# Patient Record
Sex: MALE | Race: WHITE | NOT HISPANIC OR LATINO | Employment: OTHER | URBAN - NONMETROPOLITAN AREA
[De-identification: names, ages, dates, MRNs, and addresses within clinical notes are randomized per-mention and may not be internally consistent; named-entity substitution may affect disease eponyms.]

---

## 2018-09-27 ENCOUNTER — TRANSCRIBE ORDERS (OUTPATIENT)
Dept: ORTHOPEDIC SURGERY | Facility: CLINIC | Age: 58
End: 2018-09-27

## 2018-09-27 DIAGNOSIS — M54.9 BACK PAIN WITH SCIATICA: Primary | ICD-10-CM

## 2018-09-27 DIAGNOSIS — M54.30 BACK PAIN WITH SCIATICA: Primary | ICD-10-CM

## 2018-10-04 DIAGNOSIS — M54.5 LOW BACK PAIN, UNSPECIFIED BACK PAIN LATERALITY, UNSPECIFIED CHRONICITY, WITH SCIATICA PRESENCE UNSPECIFIED: Primary | ICD-10-CM

## 2018-10-08 ENCOUNTER — OFFICE VISIT (OUTPATIENT)
Dept: ORTHOPEDIC SURGERY | Facility: CLINIC | Age: 58
End: 2018-10-08

## 2018-10-08 VITALS — WEIGHT: 181 LBS | BODY MASS INDEX: 27.43 KG/M2 | HEIGHT: 68 IN

## 2018-10-08 DIAGNOSIS — M51.36 DDD (DEGENERATIVE DISC DISEASE), LUMBAR: ICD-10-CM

## 2018-10-08 DIAGNOSIS — M54.5 LOW BACK PAIN, UNSPECIFIED BACK PAIN LATERALITY, UNSPECIFIED CHRONICITY, WITH SCIATICA PRESENCE UNSPECIFIED: Primary | ICD-10-CM

## 2018-10-08 PROBLEM — M54.50 LOW BACK PAIN: Status: ACTIVE | Noted: 2018-10-08

## 2018-10-08 PROCEDURE — 99205 OFFICE O/P NEW HI 60 MIN: CPT | Performed by: ORTHOPAEDIC SURGERY

## 2018-10-08 RX ORDER — LISINOPRIL 20 MG/1
20 TABLET ORAL DAILY
COMMUNITY

## 2018-10-08 NOTE — PROGRESS NOTES
Farrukh Helton is a 58 y.o. male   Primary provider:  Elizabeth Espinoza MD       Chief Complaint   Patient presents with   • Lumbar Spine - Pain   x-rays done lumbar spine in office today   [facility] 06/08/18  HISTORY OF PRESENT ILLNESS: low back and leg pain for about 30 years.  Pain scale today 7/10  58 y back pain, present in the low back, the pain is present continuously.  The pain is worse with sitting and standing.  The pain is improved laying on the side.    The pain is present in the low back.  The pain is severe and unrelenting.  + chills  No fevers.  No nighttime awakening pain  Pain is related to his service history in the [employer] for 20 years, dating back to 1986-87 2012 lumbar surgery performed in [place], [facility]   This is a chronic problem. The current episode started more than 1 year ago. The problem occurs constantly. The problem has been gradually worsening. Associated symptoms include chills and a fever. Associated symptoms comments: Low back and legs pain . The symptoms are aggravated by walking and standing (sitting and drving ). He has tried rest (injections) for the symptoms. The treatment provided mild relief.            CONCURRENT MEDICAL HISTORY:    Past Medical History:   Diagnosis Date   • Diabetes (CMS/HCC)        No Known Allergies      Current Outpatient Prescriptions:   •  lisinopril (PRINIVIL,ZESTRIL) 40 MG tablet, Take 40 mg by mouth Daily., Disp: , Rfl:     Past Surgical History:   Procedure Laterality Date   • CARDIAC VALVE REPLACEMENT         No family history on file.     Social History     Social History   • Marital status:      Spouse name: N/A   • Number of children: N/A   • Years of education: N/A     Occupational History   • Not on file.     Social History Main Topics   • Smoking status: Never Smoker   • Smokeless tobacco: Never Used   • Alcohol use No   • Drug use: No   • Sexual activity: Not on file     Other Topics Concern   • Not on  "file     Social History Narrative   • No narrative on file        Review of Systems   Constitutional: Positive for chills and fever.   HENT: Negative.    Eyes: Negative.    Respiratory: Negative.    Cardiovascular: Negative.         Irregular heartbeat     Gastrointestinal: Negative.    Endocrine: Negative.    Genitourinary: Negative.    Musculoskeletal: Positive for back pain.        Stiffness   Skin: Negative.    Allergic/Immunologic: Negative.    Neurological: Negative.    Hematological: Negative.    Psychiatric/Behavioral: Positive for sleep disturbance.       PHYSICAL EXAMINATION:       Ht 172.7 cm (68\")   Wt 82.1 kg (181 lb)   BMI 27.52 kg/m²     Physical Exam   Constitutional: He appears well-developed.   HENT:   Head: Normocephalic and atraumatic.   Eyes: Pupils are equal, round, and reactive to light. Right eye exhibits no discharge. Left eye exhibits no discharge.   Neck: Normal range of motion. No JVD present. No tracheal deviation present. No thyromegaly present.   Cardiovascular: Normal rate and intact distal pulses.  Exam reveals no gallop.    Pulmonary/Chest: Effort normal and breath sounds normal. No respiratory distress. He has no wheezes. He has no rales. He exhibits no tenderness.   Abdominal: Soft. Bowel sounds are normal. He exhibits no distension. There is no tenderness. There is no guarding.   Musculoskeletal: He exhibits no edema, tenderness or deformity.   Lymphadenopathy:     He has no cervical adenopathy.   Neurological: He is alert. He has normal reflexes. He displays normal reflexes. No cranial nerve deficit. Coordination normal.   Skin: Skin is warm. No rash noted. No erythema.   Psychiatric: He has a normal mood and affect. His behavior is normal. Thought content normal.       GAIT:     []  Normal  []  Antalgic    Assistive device: [x]  None  []  Walker     []  Crutches  []  Cane     []  Wheelchair  []  Stretcher    Back Exam     Tenderness   The patient is experiencing tenderness in " the lumbar.    Range of Motion   Extension: 10   Flexion: 70   Lateral Bend Right: 10   Lateral Bend Left: 10   Rotation Right: 10   Rotation Left: 10     Tests   Straight leg raise right: negative  Straight leg raise left: negative              MRI [facility]   IMPRESSION    1. No spinal stenosis or left nerve root compression.  2.  At the L3-4 level there is right foraminal narrowing with possible but doudtful impact on the right L3-4 nerve root.      I reviewed the MRI of lumbar spine there is lumbar degenerative disc disease of L2-3 and L3-4, with disc bulging into the canal at L2-3 and L3-4.    ASSESSMENT:    Diagnoses and all orders for this visit:    Low back pain, unspecified back pain laterality, unspecified chronicity, with sciatica presence unspecified    DDD (degenerative disc disease), lumbar  -     Case Request; Standing  -     ceFAZolin (ANCEF) 2 g in sodium chloride 0.9 % 100 mL IVPB; Infuse 2 g into a venous catheter 1 (One) Time.  -     Case Request    Other orders  -     lisinopril (PRINIVIL,ZESTRIL) 40 MG tablet; Take 40 mg by mouth Daily.  -     Obtain informed consent; Future  -     Obtain Informed Consent; Standing  -     aPTT; Standing  -     Basic Metabolic Panel; Standing  -     CBC (No Diff); Standing  -     ECG 12 Lead; Standing  -     XR Chest PA & Lateral; Standing  -     Protime-INR; Standing          PLAN    I recommend lumbar interbody fusion of L2-3 and L3-4, lateral; I discussed with him the operative approach, the risks and all aspects of surgical treatment.    I reviewed the technique and expected post operative recovery course.  Risks of surgery discussed including the risk of infection, wound healing complications, hematoma, spinal fluid leak, neurologic weakness, numbness, paralysis partial or complete, malpositioning of instrumentation, loosening, pseudoarthrosis, adjacent segment degeneration, worsening of pain, additional surgery, revision surgery.  Medical risks  of surgery include risk of death, blindness, heart attack, stroke, uncontrolled blood loss requiring blood transfusion, gastric ulceration perforation peritonitis, deep vein thrombosis pulmonary embolus, pneumonia, pulmonary failure necessitating prolonged ventilation, heart failure, renal failure, sepsis and multiorgan system failure.  I explained to him/her the decision to proceed for surgery is a quality of life decision, and shall be undertaken when all other treatment options have been exhausted.  He/she understands and agrees to proceed.     Needs clearance from cardiologist prior to surgery.      Andi Lopez MD

## 2021-09-15 ENCOUNTER — HOSPITAL ENCOUNTER (EMERGENCY)
Facility: HOSPITAL | Age: 61
Discharge: HOME | End: 2021-09-15
Attending: FAMILY MEDICINE | Admitting: FAMILY MEDICINE

## 2021-09-15 ENCOUNTER — APPOINTMENT (OUTPATIENT)
Dept: ULTRASOUND IMAGING | Facility: HOSPITAL | Age: 61
End: 2021-09-15

## 2021-09-15 VITALS
RESPIRATION RATE: 16 BRPM | WEIGHT: 182 LBS | BODY MASS INDEX: 27.58 KG/M2 | DIASTOLIC BLOOD PRESSURE: 73 MMHG | HEIGHT: 68 IN | TEMPERATURE: 98 F | SYSTOLIC BLOOD PRESSURE: 150 MMHG | OXYGEN SATURATION: 98 % | HEART RATE: 58 BPM

## 2021-09-15 DIAGNOSIS — M54.42 CHRONIC LEFT-SIDED LOW BACK PAIN WITH LEFT-SIDED SCIATICA: Primary | ICD-10-CM

## 2021-09-15 DIAGNOSIS — G89.29 CHRONIC LEFT-SIDED LOW BACK PAIN WITH LEFT-SIDED SCIATICA: Primary | ICD-10-CM

## 2021-09-15 LAB
ABO GROUP BLD: NORMAL
ALBUMIN SERPL-MCNC: 4.3 G/DL (ref 3.5–5.2)
ALBUMIN/GLOB SERPL: 1.7 G/DL
ALP SERPL-CCNC: 83 U/L (ref 39–117)
ALT SERPL W P-5'-P-CCNC: 25 U/L (ref 1–41)
ANION GAP SERPL CALCULATED.3IONS-SCNC: 8 MMOL/L (ref 5–15)
AST SERPL-CCNC: 19 U/L (ref 1–40)
BASOPHILS # BLD AUTO: 0.07 10*3/MM3 (ref 0–0.2)
BASOPHILS NFR BLD AUTO: 0.9 % (ref 0–1.5)
BILIRUB SERPL-MCNC: 0.3 MG/DL (ref 0–1.2)
BLD GP AB SCN SERPL QL: NEGATIVE
BUN SERPL-MCNC: 16 MG/DL (ref 8–23)
BUN/CREAT SERPL: 11.4 (ref 7–25)
CALCIUM SPEC-SCNC: 9.4 MG/DL (ref 8.6–10.5)
CHLORIDE SERPL-SCNC: 101 MMOL/L (ref 98–107)
CO2 SERPL-SCNC: 26 MMOL/L (ref 22–29)
CREAT SERPL-MCNC: 1.4 MG/DL (ref 0.76–1.27)
D-DIMER, QUANTITATIVE (MAD,POW, STR): 884 NG/ML (FEU) (ref 0–470)
DEPRECATED RDW RBC AUTO: 45.1 FL (ref 37–54)
EOSINOPHIL # BLD AUTO: 0.1 10*3/MM3 (ref 0–0.4)
EOSINOPHIL NFR BLD AUTO: 1.3 % (ref 0.3–6.2)
ERYTHROCYTE [DISTWIDTH] IN BLOOD BY AUTOMATED COUNT: 14.6 % (ref 12.3–15.4)
GFR SERPL CREATININE-BSD FRML MDRD: 51 ML/MIN/1.73
GLOBULIN UR ELPH-MCNC: 2.5 GM/DL
GLUCOSE SERPL-MCNC: 164 MG/DL (ref 65–99)
HCT VFR BLD AUTO: 37.7 % (ref 37.5–51)
HGB BLD-MCNC: 11.7 G/DL (ref 13–17.7)
HOLD SPECIMEN: NORMAL
IMM GRANULOCYTES # BLD AUTO: 0.02 10*3/MM3 (ref 0–0.05)
IMM GRANULOCYTES NFR BLD AUTO: 0.3 % (ref 0–0.5)
INR PPP: 0.91 (ref 0.8–1.2)
LYMPHOCYTES # BLD AUTO: 1.27 10*3/MM3 (ref 0.7–3.1)
LYMPHOCYTES NFR BLD AUTO: 16.2 % (ref 19.6–45.3)
Lab: NORMAL
MCH RBC QN AUTO: 26.4 PG (ref 26.6–33)
MCHC RBC AUTO-ENTMCNC: 31 G/DL (ref 31.5–35.7)
MCV RBC AUTO: 84.9 FL (ref 79–97)
MONOCYTES # BLD AUTO: 0.63 10*3/MM3 (ref 0.1–0.9)
MONOCYTES NFR BLD AUTO: 8 % (ref 5–12)
NEUTROPHILS NFR BLD AUTO: 5.75 10*3/MM3 (ref 1.7–7)
NEUTROPHILS NFR BLD AUTO: 73.3 % (ref 42.7–76)
NRBC BLD AUTO-RTO: 0 /100 WBC (ref 0–0.2)
PLATELET # BLD AUTO: 242 10*3/MM3 (ref 140–450)
PMV BLD AUTO: 9.3 FL (ref 6–12)
POTASSIUM SERPL-SCNC: 4.2 MMOL/L (ref 3.5–5.2)
PROT SERPL-MCNC: 6.8 G/DL (ref 6–8.5)
PROTHROMBIN TIME: 12.2 SECONDS (ref 11.1–15.3)
RBC # BLD AUTO: 4.44 10*6/MM3 (ref 4.14–5.8)
RH BLD: POSITIVE
SODIUM SERPL-SCNC: 135 MMOL/L (ref 136–145)
T&S EXPIRATION DATE: NORMAL
WBC # BLD AUTO: 7.84 10*3/MM3 (ref 3.4–10.8)

## 2021-09-15 PROCEDURE — 85610 PROTHROMBIN TIME: CPT | Performed by: FAMILY MEDICINE

## 2021-09-15 PROCEDURE — 99283 EMERGENCY DEPT VISIT LOW MDM: CPT

## 2021-09-15 PROCEDURE — 86900 BLOOD TYPING SEROLOGIC ABO: CPT | Performed by: FAMILY MEDICINE

## 2021-09-15 PROCEDURE — 85025 COMPLETE CBC W/AUTO DIFF WBC: CPT | Performed by: FAMILY MEDICINE

## 2021-09-15 PROCEDURE — 86850 RBC ANTIBODY SCREEN: CPT | Performed by: FAMILY MEDICINE

## 2021-09-15 PROCEDURE — 85379 FIBRIN DEGRADATION QUANT: CPT | Performed by: FAMILY MEDICINE

## 2021-09-15 PROCEDURE — 80053 COMPREHEN METABOLIC PANEL: CPT | Performed by: FAMILY MEDICINE

## 2021-09-15 PROCEDURE — 86901 BLOOD TYPING SEROLOGIC RH(D): CPT | Performed by: FAMILY MEDICINE

## 2021-09-15 PROCEDURE — 93971 EXTREMITY STUDY: CPT

## 2021-09-15 RX ORDER — CLOPIDOGREL BISULFATE 75 MG/1
75 TABLET ORAL DAILY
COMMUNITY

## 2021-09-15 RX ORDER — METOPROLOL SUCCINATE 25 MG/1
25 TABLET, EXTENDED RELEASE ORAL DAILY
COMMUNITY

## 2021-09-15 RX ORDER — HYDROCODONE BITARTRATE AND ACETAMINOPHEN 7.5; 325 MG/1; MG/1
1 TABLET ORAL ONCE
Status: COMPLETED | OUTPATIENT
Start: 2021-09-15 | End: 2021-09-15

## 2021-09-15 RX ORDER — LISINOPRIL 40 MG/1
40 TABLET ORAL DAILY
COMMUNITY
End: 2022-01-24 | Stop reason: DRUGHIGH

## 2021-09-15 RX ORDER — ATORVASTATIN CALCIUM 80 MG/1
80 TABLET, FILM COATED ORAL DAILY
COMMUNITY

## 2021-09-15 RX ORDER — ASPIRIN 81 MG/1
81 TABLET, CHEWABLE ORAL DAILY
COMMUNITY

## 2021-09-15 RX ORDER — HYDROCHLOROTHIAZIDE 12.5 MG/1
12.5 TABLET ORAL DAILY
COMMUNITY

## 2021-09-15 RX ORDER — GABAPENTIN 300 MG/1
300 CAPSULE ORAL 3 TIMES DAILY
COMMUNITY

## 2021-09-15 RX ORDER — INSULIN GLARGINE 100 [IU]/ML
INJECTION, SOLUTION SUBCUTANEOUS DAILY
COMMUNITY

## 2021-09-15 RX ORDER — FERROUS GLUCONATE 324(37.5)
TABLET ORAL 2 TIMES DAILY WITH MEALS
COMMUNITY

## 2021-09-15 RX ADMIN — HYDROCODONE BITARTRATE AND ACETAMINOPHEN 1 TABLET: 7.5; 325 TABLET ORAL at 12:05

## 2021-09-15 NOTE — ED NOTES
Patient in ultrasound at this time. Will triage upon return to room.      Lorena Parson, RN  09/15/21 5408

## 2021-09-15 NOTE — DISCHARGE INSTRUCTIONS
Continue your home medications. Follow up with your primary care provider if your symptome continue or worsen. Keep your appointment next week as scheduled with your back specialist. Return to the ER as needed.

## 2021-09-15 NOTE — ED NOTES
Patient is at an increased risk for falls. Fall light activated, yellow falls bracelet and yellow non-skid socks placed on patient. Call light within reach and patient instructed to call for assistance. Side rails up x2, bed alarm activated, and gait belt readily accessible.            Lorena Parson, RN  09/15/21 6334

## 2021-09-15 NOTE — ED PROVIDER NOTES
"Subjective   Patient presents to the ER with c/o pain to his left ankle and foot with c/o intermittent numbness and discoloration. He states he has a history of DVT to his left leg last year and was concerned about another DVT. He is currently on Plavix. He states he has current chronic back problems and has sciatica that radiates down his left leg and sometimes to his foot. He is currently being seen by Dr. Foster and Dr. Taylor for his back and is scheduled next week for his next set of epidural steroid injection. He has recently been taken off of his pain medications. Denies changes to bowel or bladder.           Review of Systems   Cardiovascular: Negative for leg swelling.   Musculoskeletal:        Left lower leg/foot numbness   Neurological: Positive for numbness. Negative for weakness.       Past Medical History:   Diagnosis Date   • Diabetes (CMS/HCC)    • DVT (deep venous thrombosis) (CMS/[facility])    • Hypertension    • Injury of back    • Kidney disease        No Known Allergies    Past Surgical History:   Procedure Laterality Date   • BYPASS GRAFT     • CARDIAC VALVE REPLACEMENT         History reviewed. No pertinent family history.    Social History     Socioeconomic History   • Marital status:      Spouse name: Not on file   • Number of children: Not on file   • Years of education: Not on file   • Highest education level: Not on file   Tobacco Use   • Smoking status: Current Every Day Smoker     Packs/day: 0.50   • Smokeless tobacco: Never Used   Substance and Sexual Activity   • Alcohol use: No   • Drug use: No           Objective    /66 (BP Location: Right arm, Patient Position: Lying)   Pulse 68   Temp 98 °F (36.7 °C) (Infrared)   Resp 18   Ht 172.7 cm (68\")   Wt 82.6 kg (182 lb)   SpO2 98%   BMI 27.67 kg/m²     Physical Exam  Vitals and nursing note reviewed.   Constitutional:       General: He is not in acute distress.     Appearance: He is well-developed. He is not " ill-appearing.   HENT:      Head: Normocephalic and atraumatic.   Cardiovascular:      Rate and Rhythm: Normal rate and regular rhythm.      Heart sounds: Normal heart sounds. No murmur heard.     Pulmonary:      Effort: Pulmonary effort is normal. No respiratory distress.      Breath sounds: Normal breath sounds. No wheezing.   Musculoskeletal:         General: No tenderness. Normal range of motion.      Cervical back: Normal range of motion and neck supple.      Comments: No redness or swelling of left leg. Left foot cool to touch but equal to right foot. No bluish discoloration. Full ROM. Unable to palpate bilateral pulses but pedal pulses obtained bilateral with doppler.     Skin:     General: Skin is warm and dry.      Capillary Refill: Capillary refill takes less than 2 seconds.   Neurological:      Mental Status: He is alert and oriented to person, place, and time.      Coordination: Coordination normal.   Psychiatric:         Behavior: Behavior normal.         Thought Content: Thought content normal.         Judgment: Judgment normal.         Procedures  Results for orders placed or performed during the hospital encounter of 09/15/21   Comprehensive Metabolic Panel    Specimen: Blood   Result Value Ref Range    Glucose 164 (H) 65 - 99 mg/dL    BUN 16 8 - 23 mg/dL    Creatinine 1.40 (H) 0.76 - 1.27 mg/dL    Sodium 135 (L) 136 - 145 mmol/L    Potassium 4.2 3.5 - 5.2 mmol/L    Chloride 101 98 - 107 mmol/L    CO2 26.0 22.0 - 29.0 mmol/L    Calcium 9.4 8.6 - 10.5 mg/dL    Total Protein 6.8 6.0 - 8.5 g/dL    Albumin 4.30 3.50 - 5.20 g/dL    ALT (SGPT) 25 1 - 41 U/L    AST (SGOT) 19 1 - 40 U/L    Alkaline Phosphatase 83 39 - 117 U/L    Total Bilirubin 0.3 0.0 - 1.2 mg/dL    eGFR Non African Amer 51 (L) >60 mL/min/1.73    Globulin 2.5 gm/dL    A/G Ratio 1.7 g/dL    BUN/Creatinine Ratio 11.4 7.0 - 25.0    Anion Gap 8.0 5.0 - 15.0 mmol/L   Protime-INR    Specimen: Blood   Result Value Ref Range    Protime 12.2 11.1 -  15.3 Seconds    INR 0.91 0.80 - 1.20   CBC Auto Differential    Specimen: Blood   Result Value Ref Range    WBC 7.84 3.40 - 10.80 10*3/mm3    RBC 4.44 4.14 - 5.80 10*6/mm3    Hemoglobin 11.7 (L) 13.0 - 17.7 g/dL    Hematocrit 37.7 37.5 - 51.0 %    MCV 84.9 79.0 - 97.0 fL    MCH 26.4 (L) 26.6 - 33.0 pg    MCHC 31.0 (L) 31.5 - 35.7 g/dL    RDW 14.6 12.3 - 15.4 %    RDW-SD 45.1 37.0 - 54.0 fl    MPV 9.3 6.0 - 12.0 fL    Platelets 242 140 - 450 10*3/mm3    Neutrophil % 73.3 42.7 - 76.0 %    Lymphocyte % 16.2 (L) 19.6 - 45.3 %    Monocyte % 8.0 5.0 - 12.0 %    Eosinophil % 1.3 0.3 - 6.2 %    Basophil % 0.9 0.0 - 1.5 %    Immature Grans % 0.3 0.0 - 0.5 %    Neutrophils, Absolute 5.75 1.70 - 7.00 10*3/mm3    Lymphocytes, Absolute 1.27 0.70 - 3.10 10*3/mm3    Monocytes, Absolute 0.63 0.10 - 0.90 10*3/mm3    Eosinophils, Absolute 0.10 0.00 - 0.40 10*3/mm3    Basophils, Absolute 0.07 0.00 - 0.20 10*3/mm3    Immature Grans, Absolute 0.02 0.00 - 0.05 10*3/mm3    nRBC 0.0 0.0 - 0.2 /100 WBC   D-dimer, Quantitative    Specimen: Blood   Result Value Ref Range    D-Dimer, Quantitative 884 (H) 0 - 470 ng/mL (FEU)   Type & Screen    Specimen: Blood   Result Value Ref Range    ABO Type AB     RH type Positive     Antibody Screen Negative     T&S Expiration Date 9/18/2021 11:59:59 PM    PREVIOUS HISTORY    Specimen: Blood   Result Value Ref Range    Previous History No record on File    Gold Top - RUST   Result Value Ref Range    Extra Tube Hold for add-ons.      US Venous Doppler Lower Extremity Left (duplex)    Result Date: 9/15/2021  Narrative: Ultrasound venous duplex left lower extremity HISTORY: Previous deep venous thrombosis. Stent. Arterial blockage. Preoperative evaluation to rule out deep venous thrombosis. Duplex ultrasound of the deep venous system of the left lower extremity was performed FINDINGS: Real-time images demonstrate normal compressibility without evidence of intraluminal thrombus. Doppler shows phasic flow and  augmentation. Color Doppler also reveals venous patency.     Impression: CONCLUSION: No ultrasound evidence of deep venous thrombosis of the left lower extremity. [id number] Electronically signed by:  Patrick Antonio MD  9/15/2021 11:07 AM CDT Workstation: [id number]             ED Course  ED Course as of Sep 15 1258   Wed Sep 15, 2021   1242 Work up reviewed with patient. US negative. Advised to follow up with PCP and spine specialist for further evaluation and treatment.     [SH]   1242 Pulses obtained by doppler bilateral.     [SH]      ED Course User Index  [SH] Spring Ayala, MARCK                                           [facility]    Final diagnoses:   Chronic left-sided low back pain with left-sided sciatica       ED Disposition  ED Disposition     ED Disposition Condition Comment    Discharge Stable           Elizabeth Espinoza MD  [address] [place] [address]   [facility] [address] [phone]    Schedule an appointment as soon as possible for a visit   ER follow up         Medication List      No changes were made to your prescriptions during this visit.          Spring Ayala APRN  09/15/21 0810

## 2021-09-15 NOTE — ED NOTES
Patient states hx of anemia and that he is currently waiting to start iron infusions.        Lorena Parson, RN  09/15/21 0614

## 2021-10-06 DIAGNOSIS — I73.9 PVD (PERIPHERAL VASCULAR DISEASE) (HCC): Primary | ICD-10-CM

## 2022-01-24 ENCOUNTER — OFFICE VISIT (OUTPATIENT)
Dept: CARDIAC SURGERY | Facility: CLINIC | Age: 62
End: 2022-01-24

## 2022-01-24 VITALS
BODY MASS INDEX: 30.04 KG/M2 | OXYGEN SATURATION: 99 % | DIASTOLIC BLOOD PRESSURE: 70 MMHG | HEART RATE: 71 BPM | TEMPERATURE: 98.2 F | SYSTOLIC BLOOD PRESSURE: 112 MMHG | WEIGHT: 191.4 LBS | HEIGHT: 67 IN

## 2022-01-24 DIAGNOSIS — E11.51 CONTROLLED TYPE 2 DIABETES MELLITUS WITH DIABETIC PERIPHERAL ANGIOPATHY WITHOUT GANGRENE, WITH LONG-TERM CURRENT USE OF INSULIN: ICD-10-CM

## 2022-01-24 DIAGNOSIS — F17.218 NICOTINE DEPENDENCE, CIGARETTES, WITH OTHER NICOTINE-INDUCED DISORDERS: ICD-10-CM

## 2022-01-24 DIAGNOSIS — Z79.4 CONTROLLED TYPE 2 DIABETES MELLITUS WITH DIABETIC PERIPHERAL ANGIOPATHY WITHOUT GANGRENE, WITH LONG-TERM CURRENT USE OF INSULIN: ICD-10-CM

## 2022-01-24 DIAGNOSIS — E66.3 OVERWEIGHT WITH BODY MASS INDEX (BMI) OF 29 TO 29.9 IN ADULT: ICD-10-CM

## 2022-01-24 DIAGNOSIS — I70.219 ATHEROSCLEROSIS OF ARTERY OF EXTREMITY WITH INTERMITTENT CLAUDICATION: ICD-10-CM

## 2022-01-24 DIAGNOSIS — G59 MONONEUROPATHY DUE TO UNDERLYING DISEASE: ICD-10-CM

## 2022-01-24 DIAGNOSIS — E78.2 MIXED HYPERLIPIDEMIA: ICD-10-CM

## 2022-01-24 DIAGNOSIS — I73.9 PVD (PERIPHERAL VASCULAR DISEASE): Primary | ICD-10-CM

## 2022-01-24 DIAGNOSIS — I10 BENIGN ESSENTIAL HTN: ICD-10-CM

## 2022-01-24 PROCEDURE — 99204 OFFICE O/P NEW MOD 45 MIN: CPT | Performed by: THORACIC SURGERY (CARDIOTHORACIC VASCULAR SURGERY)

## 2022-01-24 RX ORDER — TRAMADOL HYDROCHLORIDE 50 MG/1
50 TABLET ORAL
COMMUNITY

## 2022-01-24 RX ORDER — EZETIMIBE 10 MG/1
10 TABLET ORAL DAILY
COMMUNITY

## 2022-01-24 RX ORDER — METFORMIN HYDROCHLORIDE 500 MG/1
500 TABLET, EXTENDED RELEASE ORAL
COMMUNITY

## 2022-01-24 RX ORDER — METOPROLOL TARTRATE 50 MG/1
25 TABLET, FILM COATED ORAL
COMMUNITY

## 2022-01-24 RX ORDER — ZONISAMIDE 100 MG/1
100 CAPSULE ORAL EVERY MORNING
COMMUNITY

## 2022-01-24 RX ORDER — OMEPRAZOLE 40 MG/1
40 CAPSULE, DELAYED RELEASE ORAL DAILY
COMMUNITY

## 2022-01-28 ENCOUNTER — PATIENT ROUNDING (BHMG ONLY) (OUTPATIENT)
Dept: CARDIAC SURGERY | Facility: CLINIC | Age: 62
End: 2022-01-28

## 2022-01-28 NOTE — PROGRESS NOTES
January 28, 2022    Hello, may I speak with Farrukh Helton?    My name is JACQUELINE SANDRA, CARDIOVASCULAR TECHNICIAN    I am  with [facility] CARDIOTHORACIC SURGERY  [address] [place] [address] [address] [place] [address] [phone].    Before we get started may I verify your date of birth? 1960    I am calling to officially welcome you to our practice and ask about your recent visit. Is this a good time to talk? YES    Tell me about your visit with us. What things went well?  The entire visit was thorough. I did think of a question later on and they were very pleasant and willing to send my doctor a message and ask for me.        We're always looking for ways to make our patients' experiences even better. Do you have recommendations on ways we may improve?  NO, things went well. I was very pleased with how everyone treated me.     Overall were you satisfied with your first visit to our practice? YES.        I appreciate you taking the time to speak with me today. Is there anything else I can do for you? NO.       Thank you, and have a great day.

## 2022-02-01 PROBLEM — E11.51 CONTROLLED TYPE 2 DIABETES MELLITUS WITH DIABETIC PERIPHERAL ANGIOPATHY WITHOUT GANGRENE, WITH LONG-TERM CURRENT USE OF INSULIN (HCC): Status: ACTIVE | Noted: 2022-02-01

## 2022-02-01 PROBLEM — I73.9 PVD (PERIPHERAL VASCULAR DISEASE) (HCC): Status: ACTIVE | Noted: 2022-02-01

## 2022-02-01 PROBLEM — Z79.4 CONTROLLED TYPE 2 DIABETES MELLITUS WITH DIABETIC PERIPHERAL ANGIOPATHY WITHOUT GANGRENE, WITH LONG-TERM CURRENT USE OF INSULIN: Status: ACTIVE | Noted: 2022-02-01

## 2022-02-01 PROBLEM — E66.3 OVERWEIGHT WITH BODY MASS INDEX (BMI) OF 29 TO 29.9 IN ADULT: Status: ACTIVE | Noted: 2022-02-01

## 2022-02-01 PROBLEM — F17.218 NICOTINE DEPENDENCE, CIGARETTES, WITH OTHER NICOTINE-INDUCED DISORDERS: Status: ACTIVE | Noted: 2022-02-01

## 2022-02-01 PROBLEM — G59 MONONEUROPATHY DUE TO UNDERLYING DISEASE: Status: ACTIVE | Noted: 2022-02-01

## 2022-02-01 PROBLEM — I70.219 ATHEROSCLEROSIS OF ARTERY OF EXTREMITY WITH INTERMITTENT CLAUDICATION (HCC): Status: ACTIVE | Noted: 2022-02-01

## 2022-02-01 PROBLEM — I10 BENIGN ESSENTIAL HTN: Status: ACTIVE | Noted: 2022-02-01

## 2022-02-01 PROBLEM — E78.2 MIXED HYPERLIPIDEMIA: Status: ACTIVE | Noted: 2022-02-01

## 2022-02-01 RX ORDER — CILOSTAZOL 100 MG/1
100 TABLET ORAL
Qty: 60 TABLET | Refills: 11 | Status: SHIPPED | OUTPATIENT
Start: 2022-02-01 | End: 2023-02-01

## 2022-02-01 NOTE — PROGRESS NOTES
1/24/2022    Farrukh Helton  1960    Chief Complaint:    Chief Complaint   Patient presents with   • Peripheral Vascular Disease       HPI:      PCP:  Elizabeth Espinoza MD  Cardiology:  Dr Thomas, Dr Ayala  Nephrology:  Dr Caal    62 y.o. male with HTN(stable, increased risk stroke, rupture), Hyperlipidemia(stable, increased risk cardiovascular events), Diabetes Mellitus(stable, increased risk cardiovascular events), Obesity(uncontrolled, increased risk cardiovascular events), Smoker(uncontrolled, increased risk cardiovascular events) and Chronic Kidney Disease(stable, increased risk renal failure) , CAD(stable, increase risk cardiovascular events), PVD(chronic severe progression, increase risk cardiovascular events).  smokes 1/2 PPD.  Moderate pain back, LEFT foot x years.  Neuropathy, back operation in planning.  Walks with cane.  Multiple PVD interventions, last 1/2022.  Chronic iron infusions..  No TIA stroke amaurosis.  No MI claudication. No other associated signs, symptoms or modifying factors.    2016 LEFT femoral-PTA bypass (cadaver vein)  4x endovascular interventions, most recent 10/2021, 1/2022 11/2020 Lower extremity arterial duplex:  RIGHT severe stenosis origin profunda.  LEFT femPT graft severe mid stenosis.  1/2022 angiogram ([facility]):  LEFT SFA occluded, multiple stents, recon distal popliteal.  PTA occluded above ankle, JORGE occluded at ankle, peroneal occluded mid calf.  1/2022 RATNA RIGHT .71 biphasic.  LEFT .32 tri/bi/monophasic.  LEFT fem-PT graft occluded.    9/2021 Lower extremity venous duplex:  No dvt.    CABG x4  8/2020 Echocardiogram:  EF 60%, LA 46mm, LV 40mm, mild MR, tr TR.    The following portions of the patient's history were reviewed and updated as appropriate: allergies, current medications, past family history, past medical history, past social history, past surgical history and problem list.  Recent images independently reviewed.  Available laboratory values  reviewed.    PMH:  Past Medical History:   Diagnosis Date   • Diabetes (HCC)    • DVT (deep venous thrombosis) (HCC)    • Hypertension    • Injury of back    • Kidney disease      Past Surgical History:   Procedure Laterality Date   • BYPASS GRAFT     • CARDIAC VALVE REPLACEMENT       No family history on file.  Social History     Tobacco Use   • Smoking status: Current Every Day Smoker     Packs/day: 0.50   • Smokeless tobacco: Never Used   Substance Use Topics   • Alcohol use: No   • Drug use: No       ALLERGIES:  No Known Allergies      MEDICATIONS:    Current Outpatient Medications:   •  aspirin 81 MG chewable tablet, Chew 81 mg Daily., Disp: , Rfl:   •  atorvastatin (LIPITOR) 80 MG tablet, Take 80 mg by mouth Daily., Disp: , Rfl:   •  clopidogrel (PLAVIX) 75 MG tablet, Take 75 mg by mouth Daily., Disp: , Rfl:   •  ezetimibe (Zetia) 10 MG tablet, Take 10 mg by mouth Daily., Disp: , Rfl:   •  ferrous gluconate 324 (37.5 Fe) MG tablet tablet, Take  by mouth 2 (Two) Times a Day With Meals., Disp: , Rfl:   •  gabapentin (NEURONTIN) 300 MG capsule, Take 300 mg by mouth 3 (Three) Times a Day., Disp: , Rfl:   •  hydroCHLOROthiazide (HYDRODIURIL) 12.5 MG tablet, Take 12.5 mg by mouth Daily., Disp: , Rfl:   •  lisinopril (PRINIVIL,ZESTRIL) 20 MG tablet, Take 20 mg by mouth Daily., Disp: , Rfl:   •  metFORMIN ER (GLUCOPHAGE-XR) 500 MG 24 hr tablet, Take 500 mg by mouth Daily With Breakfast., Disp: , Rfl:   •  metoprolol succinate XL (TOPROL-XL) 25 MG 24 hr tablet, Take 25 mg by mouth Daily., Disp: , Rfl:   •  metoprolol tartrate (LOPRESSOR) 50 MG tablet, Take 25 mg by mouth. 1/2 tab daily, Disp: , Rfl:   •  omeprazole (priLOSEC) 40 MG capsule, Take 40 mg by mouth Daily., Disp: , Rfl:   •  RANOLAZINE ER PO, Take 500 mg by mouth Daily., Disp: , Rfl:   •  traMADol (ULTRAM) 50 MG tablet, Take 50 mg by mouth. 2 nightly, Disp: , Rfl:   •  zonisamide (ZONEGRAN) 100 MG capsule, Take 100 mg by mouth Every Morning., Disp: , Rfl:  "  •  cilostazol (PLETAL) 100 MG tablet, Take 1 tablet by mouth 2 (Two) Times a Day Before Meals., Disp: 60 tablet, Rfl: 11  •  insulin glargine (LANTUS, SEMGLEE) 100 UNIT/ML injection, Inject  under the skin into the appropriate area as directed Daily., Disp: , Rfl:     Review of Systems   Review of Systems   Constitutional: Positive for malaise/fatigue. Negative for weight loss.   Cardiovascular: Positive for claudication. Negative for chest pain and dyspnea on exertion.   Respiratory: Negative for cough and shortness of breath.    Skin: Negative for color change and poor wound healing.   Musculoskeletal: Positive for arthritis, back pain, muscle weakness and myalgias.   Neurological: Positive for numbness and paresthesias. Negative for dizziness and weakness.       Physical Exam   Vitals:    01/24/22 1510 01/24/22 1511   BP: 116/74 112/70   BP Location: Left arm Right arm   Pulse: 71    Temp: 98.2 °F (36.8 °C)    TempSrc: Infrared    SpO2: 99%    Weight: 86.8 kg (191 lb 6.4 oz)    Height: 170.2 cm (67\")      Body surface area is 1.98 meters squared.  Body mass index is 29.98 kg/m².  Physical Exam  Constitutional:       General: He is not in acute distress.     Appearance: He is not ill-appearing.   HENT:      Right Ear: Hearing normal.      Left Ear: Hearing normal.      Nose: No nasal deformity.      Mouth/Throat:      Dentition: Normal dentition. Does not have dentures.   Cardiovascular:      Rate and Rhythm: Normal rate and regular rhythm.      Pulses:           Carotid pulses are 2+ on the right side and 2+ on the left side.       Radial pulses are 2+ on the right side and 2+ on the left side.        Dorsalis pedis pulses are 2+ on the right side and 1+ on the left side.        Posterior tibial pulses are 1+ on the right side and 0 on the left side.      Heart sounds: No murmur heard.      Pulmonary:      Effort: Pulmonary effort is normal.      Breath sounds: Normal breath sounds.   Abdominal:      General: " There is no distension.      Palpations: Abdomen is soft. There is no mass.      Tenderness: There is no abdominal tenderness.   Musculoskeletal:         General: No deformity.      Comments: Gait normal.    Skin:     General: Skin is warm and dry.      Coloration: Skin is not pale.      Findings: No erythema.      Comments: No venous staining   Neurological:      Mental Status: He is alert and oriented to person, place, and time.   Psychiatric:         Speech: Speech normal.         Behavior: Behavior is cooperative.         Thought Content: Thought content normal.         Judgment: Judgment normal.         BUN   Date Value Ref Range Status   09/15/2021 16 8 - 23 mg/dL Final     Creatinine   Date Value Ref Range Status   09/15/2021 1.40 (H) 0.76 - 1.27 mg/dL Final     eGFR Non  Amer   Date Value Ref Range Status   09/15/2021 51 (L) >60 mL/min/1.73 Final       ASSESSMENT:  Diagnoses and all orders for this visit:    1. PVD (peripheral vascular disease) (HCC) (Primary)    2. Controlled type 2 diabetes mellitus with diabetic peripheral angiopathy without gangrene, with long-term current use of insulin ([facility])    3. Mixed hyperlipidemia    4. Mononeuropathy due to underlying disease    5. Benign essential HTN    6. Atherosclerosis of artery of extremity with intermittent claudication ([facility])    7. Overweight with body mass index (BMI) of 29 to 29.9 in adult    8. Nicotine dependence, cigarettes, with other nicotine-induced disorders    Other orders  -     cilostazol (PLETAL) 100 MG tablet; Take 1 tablet by mouth 2 (Two) Times a Day Before Meals.  Dispense: 60 tablet; Refill: 11      PLAN:  Detailed discussion with Farrukh Helton regarding situation and options.  Severe PVD, diabetic angiopathy.  Prior bypass with cadaver vein 2016, multiple endovascular interventions since, most recent 1/2022, with recurrent graft occlusion 2 weeks later.  Imaging reviewed, I do not see any durable options for endovascular or bypass  for additional revascularization. Will try manage medically, may require amputation in future. Multiple risk factors with severe comorbidities.  No intervention indicated at this time.  Will follow with interval imaging.  Risks, benefits discussed.  Understands and wishes to proceed with plan.    Pletal 100mg BID  Return in 6 months with RATNA    Return after above studies complete  Smoking cessation advised and assistance options offered including behavioral counseling ([employer] [facility] [facility]essation [facility]), Nicotine replacement therapy (patches or gum), pharmacologic therapy (Chantix, Wellbutrin). patient understands that continued use of tobacco products increases risk of heart disease, stroke, cancer; counseling for 3-5min.  Obesity Class  0. Increased risk cardiovascular events, sleep and breathing disorders, joint issues, type 2 diabetes mellitus. Options for weight management, heart healthy diet, exercise programs, and associated health risks of obesity discussed.    Recommended regular physical activity, progressive walking program.  Continue current medications as directed.  Will Obtain relevant old records.  Advance Care Planning   ACP discussion was declined by the patient. Patient does not have an advance directive, declines further assistance.    Thank you for the opportunity to participate in this patient's care.    Copy to primary care provider.

## 2022-02-02 DIAGNOSIS — I25.810 ATHEROSCLEROSIS OF AUTOLOGOUS VEIN CORONARY ARTERY BYPASS GRAFT, UNSPECIFIED WHETHER ANGINA PRESENT: ICD-10-CM

## 2022-02-02 DIAGNOSIS — R94.31 ABNORMAL EKG: ICD-10-CM

## 2022-02-02 DIAGNOSIS — R07.9 CHEST PAIN, UNSPECIFIED TYPE: Primary | ICD-10-CM

## 2022-02-02 DIAGNOSIS — I25.10 ATHEROSCLEROSIS OF NATIVE CORONARY ARTERY OF NATIVE HEART, UNSPECIFIED WHETHER ANGINA PRESENT: ICD-10-CM

## 2022-02-22 ENCOUNTER — HOSPITAL ENCOUNTER (OUTPATIENT)
Dept: NUCLEAR MEDICINE | Facility: HOSPITAL | Age: 62
Discharge: HOME | End: 2022-02-22

## 2022-02-22 ENCOUNTER — HOSPITAL ENCOUNTER (OUTPATIENT)
Dept: CARDIOLOGY | Facility: HOSPITAL | Age: 62
Discharge: HOME | End: 2022-02-22

## 2022-02-22 DIAGNOSIS — I25.810 ATHEROSCLEROSIS OF AUTOLOGOUS VEIN CORONARY ARTERY BYPASS GRAFT, UNSPECIFIED WHETHER ANGINA PRESENT: ICD-10-CM

## 2022-02-22 DIAGNOSIS — R94.31 ABNORMAL EKG: ICD-10-CM

## 2022-02-22 DIAGNOSIS — R07.9 CHEST PAIN, UNSPECIFIED TYPE: ICD-10-CM

## 2022-02-22 DIAGNOSIS — I25.10 ATHEROSCLEROSIS OF NATIVE CORONARY ARTERY OF NATIVE HEART, UNSPECIFIED WHETHER ANGINA PRESENT: ICD-10-CM

## 2022-02-22 PROCEDURE — A9500 TC99M SESTAMIBI: HCPCS | Performed by: INTERNAL MEDICINE

## 2022-02-22 PROCEDURE — 93017 CV STRESS TEST TRACING ONLY: CPT

## 2022-02-22 PROCEDURE — 0 TECHNETIUM SESTAMIBI: Performed by: INTERNAL MEDICINE

## 2022-02-22 PROCEDURE — 78452 HT MUSCLE IMAGE SPECT MULT: CPT

## 2022-02-22 PROCEDURE — 25010000002 REGADENOSON 0.4 MG/5ML SOLUTION: Performed by: INTERNAL MEDICINE

## 2022-02-22 RX ORDER — SODIUM CHLORIDE 0.9 % (FLUSH) 0.9 %
10 SYRINGE (ML) INJECTION ONCE
Status: COMPLETED | OUTPATIENT
Start: 2022-02-22 | End: 2022-02-22

## 2022-02-22 RX ADMIN — Medication 10 ML: at 10:30

## 2022-02-22 RX ADMIN — REGADENOSON 0.4 MG: 0.08 INJECTION, SOLUTION INTRAVENOUS at 10:30

## 2022-02-22 RX ADMIN — TECHNETIUM TC 99M SESTAMIBI 1 DOSE: 1 INJECTION INTRAVENOUS at 10:31

## 2022-02-22 RX ADMIN — TECHNETIUM TC 99M SESTAMIBI 1 DOSE: 1 INJECTION INTRAVENOUS at 08:39

## 2022-02-22 NOTE — H&P
Cardiology History and Physical Note        Patient Name: Farrukh Helton  Age/Sex: 62 y.o. male  : 1960  MRN: [id number]    Date of Admission : 2022    Primary care Physician: Elizabeth Espinoza MD    Reason for Admission: Atypical chest pain history of atherosclerotic coronary artery disease preoperative cardiovascular risk assessment Lexiscan Cardiolite stress test      Subjective:       Chief Complaint: Chest pain    History of Present Illness:  Farrukh Helton is a 62 y.o. male     Height of 5 feet 7 inches weight of 183 pounds with a body mass index of 29 with a past medical history significant for atherosclerotic coronary artery disease s/p coronary angiogram done in  which had revealed diffuse triple-vessel coronary artery disease with subsequent coronary artery bypass grafting done in [facility], previous hospitalization with respiratory failure in the [facility] in [place] in May 2021, arterial hypertension, hypertensive heart disease, hyperlipidemia, chronic kidney disease stage III, previous history of renal artery stenting, history of deep vein thrombosis of the left lower extremity on anticoagulation with Eliquis, peripheral vascular disease with bilateral iliac stenting with left femoral to aortic bypass grafting, hyperlipidemia, strong family history for coronary artery disease, anemia, diabetes and family history for coronary artery disease.    Patient initial coronary artery disease was diagnosed in  when patient was in the [facility] in [facility].  Patient subsequently had symptoms of chest pain with coronary angiogram done in  with subsequent coronary artery bypass grafting.    Patient was hospitalized in May 2021 at the [facility] in [place] with shortness of breath throat discomfort in the intensive care unit at [facility].    Patient is currently residing in [place].    Patient complained of having throat tightness and chest pain which has been sharp which is similar in  quality as the one which she had experienced prior to the bypass grafting.  Patient complained of having bilateral lower extremity edema.  Patient has had symptoms of palpitations on and off.    Patient on further questioning denies any fever chills productive cough.  Patient denies any hemoptysis hematuria bright of blood per rectum.    Patient resting electrocardiogram done reveals sinus rhythm at the rate of 67 bpm with isolated premature ventricular complex.    Patient blood pressure is 120/60 pulse of 69 respiration of 18 oxygen saturation of 96%    Patient 10 point review of system except for what stated in the history of present is a negative      Concurrent Medical History:  1.  Preoperative cardiovascular risk assessment.  2.  Atypical chest pain.  3.  Atherosclerotic coronary artery disease.  4.  Coronary artery bypass grafting done in 2016.  5.  Arterial hypertension.  6.  Hypertensive heart disease.  7.  Nonrheumatic mild mitral and nonrheumatic mild tricuspid regurgitation.  8.  Hyperlipidemia.  9.  Diabetes.  10.  Renal artery stenting.  11.  Chronic kidney disease.  12.  History of deep vein thrombosis on anticoagulation with Eliquis.  13.  Anemia.  14.  Peripheral vascular disease.  15.  Aortofemoral bypass grafting with bilateral iliac stenting.  16.  Arthritis.  17.  Chronic back pain      Past Surgical History:  1.  Coronary angiogram.  2.  Coronary artery bypass grafting done in 2016 with four-vessel bypass grafting.  3.  Colonoscopy.  4.  Esophagogastroduodenoscopy.  5.  Back surgery.  6.  Right hernia repair.  7.  Right renal artery stent placement.  8.  Left aortofemoral bypass grafting.  9.  Left iliac stent placement.  10.  Right knee surgery.  11.  Spinal tap.  12.  Capsule endoscopy        Family History: Family history significant for father and brother who had coronary artery disease.      Social History: Smokes half pack per day, no history of alcohol intake       Cardiac Risk factor:    1.  Male.  2.  Arterial hypertension.  3.  Hyperlipidemia.  4.  Tobacco abuse.  5.  Family history for coronary artery disease.    Allergies:  No Known Allergies    Home Medication::  Prior to Admission medications    Medication Sig Start Date End Date Taking? Authorizing Provider   aspirin 81 MG chewable tablet Chew 81 mg Daily.    Allie Silva MD   atorvastatin (LIPITOR) 80 MG tablet Take 80 mg by mouth Daily.    Allie Silva MD   cilostazol (PLETAL) 100 MG tablet Take 1 tablet by mouth 2 (Two) Times a Day Before Meals. 2/1/22 2/1/23  Alan Roberts MD   clopidogrel (PLAVIX) 75 MG tablet Take 75 mg by mouth Daily.    Allie Silva MD   ezetimibe (Zetia) 10 MG tablet Take 10 mg by mouth Daily.    Allie Silva MD   ferrous gluconate 324 (37.5 Fe) MG tablet tablet Take  by mouth 2 (Two) Times a Day With Meals.    Allie Silva MD   gabapentin (NEURONTIN) 300 MG capsule Take 300 mg by mouth 3 (Three) Times a Day.    Allie Silva MD   hydroCHLOROthiazide (HYDRODIURIL) 12.5 MG tablet Take 12.5 mg by mouth Daily.    Allie Silva MD   insulin glargine (LANTUS, SEMGLEE) 100 UNIT/ML injection Inject  under the skin into the appropriate area as directed Daily.    Allie Silva MD   lisinopril (PRINIVIL,ZESTRIL) 20 MG tablet Take 20 mg by mouth Daily.    Allie Silva MD   metFORMIN ER (GLUCOPHAGE-XR) 500 MG 24 hr tablet Take 500 mg by mouth Daily With Breakfast.    Allie Silva MD   metoprolol succinate XL (TOPROL-XL) 25 MG 24 hr tablet Take 25 mg by mouth Daily.    Allie Silva MD   metoprolol tartrate (LOPRESSOR) 50 MG tablet Take 25 mg by mouth. 1/2 tab daily    Allie Silva MD   omeprazole (priLOSEC) 40 MG capsule Take 40 mg by mouth Daily.    Allie Silva MD   RANOLAZINE ER PO Take 500 mg by mouth Daily.    Allie Silva MD   traMADol (ULTRAM) 50 MG tablet Take 50 mg by mouth. 2 nightly     Provider, Historical, MD   zonisamide (ZONEGRAN) 100 MG capsule Take 100 mg by mouth Every Morning.    Provider, MD Allie         Review of Systems   Constitutional: Positive for fatigue. Negative for chills, fever and unexpected weight change.   HENT: Negative for hearing loss and nosebleeds.    Eyes: Negative for visual disturbance.   Respiratory: Positive for chest tightness and shortness of breath. Negative for cough and wheezing.    Cardiovascular: Positive for chest pain. Negative for palpitations and leg swelling.   Gastrointestinal: Negative for abdominal pain, blood in stool, constipation, diarrhea, nausea and vomiting.   Endocrine: Negative for cold intolerance, heat intolerance, polydipsia, polyphagia and polyuria.   Genitourinary: Negative for hematuria.   Musculoskeletal: Positive for back pain. Negative for joint swelling, myalgias and neck pain.   Skin: Negative for color change, rash and wound.   Neurological: Negative for dizziness, seizures, syncope, light-headedness, numbness and headaches.   Hematological: Does not bruise/bleed easily.         Objective:     There were no vitals taken for this visit.  Blood pressure 120/60 pulse of 69 respiration of 18 oxygen saturation of 96%  Constitutional:       Appearance: Well-developed.   Eyes:      General: No scleral icterus.     Conjunctiva/sclera: Conjunctivae normal.      Pupils: Pupils are equal, round, and reactive to light.   HENT:      Head: Normocephalic and atraumatic.      Left Ear: External ear normal.      Nose: Nose normal.   Neck:      Thyroid: No thyromegaly.      Vascular: No JVD.      Trachea: No tracheal deviation.      Lymphadenopathy: No cervical adenopathy.   Pulmonary:      Breath sounds: Normal breath sounds. No stridor.   Cardiovascular:      Normal rate. Regular rhythm.   Abdominal:      General: Bowel sounds are normal.   Musculoskeletal: Normal range of motion.      Cervical back: Normal range of motion and neck supple.  Skin:     General: Skin is warm and dry.   Neurological:      Mental Status: Alert and oriented to person, place, and time.      Deep Tendon Reflexes: Reflexes are normal and symmetric.   Psychiatric:         Behavior: Behavior normal.         Thought Content: Thought content normal.         Judgment: Judgment normal.         Lab Review:           Invalid input(s): LABALBU, PROT                                    EKG:   ECG/EMG Results (last 24 hours)     ** No results found for the last 24 hours. **          Imaging:  Imaging Results (Last 24 Hours)     ** No results found for the last 24 hours. **          I personally viewed and interpreted the patient's EKG/Telemetry data.    Assessment:   1.  Preoperative cardiovascular risk assessment.  2.  Atherosclerotic coronary artery disease.  3.  Arterial hypertension.  4.  Hypertensive heart disease.  5.  Peripheral vascular disease.  6.  Chronic kidney disease.          Plan:   1.  Preoperative cardiovascular risk assessment.  Patient does have history of documented atherosclerotic coronary artery disease.  Patient is to undergo surgical intervention.  Patient has been recommended to undergo a myocardial perfusion Lexiscan Cardiolite stress test.  Risk-benefit treatment option for the myocardial perfusion Lexiscan Cardiolite stress test were discussed with the patient and an informed consent was obtained.    Myocardial perfusion scintigraphy (MPS)  was recommended to the patient as the best non-invasive modality for the assessment of obstructive CAD.  I  did spend some time discussing the procedure, as well as risks and benefits.  I also informed the patient that the risk of nonfatal MI or major cardiac complication is about  0.02%. The patient was also informed about the risks and benefits of MPS. Patient was also provided with a handout describing the test, as well as patient instructions prior to testing.      I also discussed the results of MPS as divided into  risks.The NPV of this test is as high as 98%.  I also specifically discussed the risk of cardiac death with the following percentages:    Low-risk MPS:                          0.5% per year  Mildly abnormal MPS:              2.7%  Moderately abnormal:             2.9%  Severely abnormal:                 4.2%    2.  Atherosclerotic coronary artery disease 40 coronary artery disease with previous coronary artery bypass grafting done in 2016.  Patient is currently on Ranexa aspirin.  Patient resting electrocardiogram done reveals sinus rhythm with isolated premature ventricular complex.  Patient at the present time has been recommended to undergo a myocardial perfusion Lexiscan Cardiolite stress test.  Patient has been recommended to increase the Ranexa to 1000 mg twice a day and would start the patient on Imdur 30 mg once a day.    3.  Arterial hypertension.  Patient blood pressure is currently 120/60.  Patient has been recommended to continue with the present dose of the lisinopril metoprolol and the hydrochlorothiazide.  Patient has been counseled to decrease her salt intake.      4.  Hypertensive heart disease with nonrheumatic mitral and tricuspid regurgitation.  Clinically at the present time patient is euvolemic and not in congestive heart failure.    5.  Hyperlipidemia.  Patient has been counseled on low-fat low-cholesterol diet and to continue with the present dose of the Lipitor.    6.  History of deep vein thrombosis in the left lower extremity.  Patient is on chronic anticoagulation with Eliquis 5 mg twice a day.  Patient has complained of having blood post rectum and had undergone colonoscopy including capsule endoscopy.  Patient has been followed by the primary care physician.    7.  Peripheral vascular disease s/p left aortofemoral bypass grafting with bilateral iliac stenting.  Patient has been recommended to follow-up with vascular surgery.    8.  History of chronic kidney disease with renal artery  stenting.  Patient has stage III chronic kidney disease and has been followed by nephrology.    9.  Anemia.  Patient is on anticoagulation with Eliquis does have history of anemia and patient H&H would be followed.    10.  Chronic obstructive lung disease with tobacco abuse is noted.  Patient has been counseled extensively to quit smoking.  Patient has been explained the risk associated with smoking and the occurrence and progression of atherosclerotic coronary artery disease and peripheral vascular disease.    11.  Diabetes.  Patient has been counseled on American diabetic Association diet..  Premature ventricular complex.  Patient has been recommended to take magnesium oxide 400 mg twice a day.      Above plan of management were discussed with the patient        Time: time spent in face-to-face evaluation of greater than 55 minutes and interacting and formulating examining and discussing the plan with the patient with 50% of greater time spent in face-to-face interaction.    Electronically signed by Refugio Ayala MD, 02/21/22, 9:14 PM CST.

## 2022-02-23 NOTE — PROGRESS NOTES
Patient myocardial perfusion Lexiscan Cardiolite stress test was suggestive of severe area of ischemia located in the basal inferior lateral wall and a intermediate risk study with an ejection fraction of 70%.    Patient does have history of documented coronary artery disease with previous PTCA and stenting.  Patient had undergone coronary artery bypass grafting.  Patient is on anticoagulation with Eliquis.    Patient has been recommended coronary angiogram.

## 2022-04-01 LAB
BH CV REST NUCLEAR ISOTOPE DOSE: 10.8 MCI
BH CV STRESS BP STAGE 1: NORMAL
BH CV STRESS COMMENTS STAGE 1: NORMAL
BH CV STRESS DOSE REGADENOSON STAGE 1: 0.4
BH CV STRESS DURATION MIN STAGE 1: 0
BH CV STRESS DURATION SEC STAGE 1: 10
BH CV STRESS HR STAGE 1: 50
BH CV STRESS NUCLEAR ISOTOPE DOSE: 35 MCI
BH CV STRESS PROTOCOL 1: NORMAL
BH CV STRESS RECOVERY BP: NORMAL MMHG
BH CV STRESS RECOVERY HR: 68 BPM
BH CV STRESS STAGE 1: 1
LV EF NUC BP: 75 %
MAXIMAL PREDICTED HEART RATE: 157 BPM
PERCENT MAX PREDICTED HR: 46.5 %
STRESS BASELINE BP: NORMAL MMHG
STRESS BASELINE HR: 49 BPM
STRESS PERCENT HR: 55 %
STRESS POST ESTIMATED WORKLOAD: 1 METS
STRESS POST PEAK BP: NORMAL MMHG
STRESS POST PEAK HR: 73 BPM
STRESS TARGET HR: 133 BPM

## 2022-04-07 ENCOUNTER — TRANSCRIBE ORDERS (OUTPATIENT)
Dept: ADMINISTRATIVE | Facility: HOSPITAL | Age: 62
End: 2022-04-07

## 2022-04-07 DIAGNOSIS — I73.9 PERIPHERAL VASCULAR DISEASE, UNSPECIFIED: Primary | ICD-10-CM

## 2022-04-21 DIAGNOSIS — R93.1 ABNORMAL NUCLEAR CARDIAC IMAGING TEST: Primary | ICD-10-CM

## 2022-04-22 ENCOUNTER — LAB (OUTPATIENT)
Dept: LAB | Facility: HOSPITAL | Age: 62
End: 2022-04-22

## 2022-04-22 DIAGNOSIS — R93.1 ABNORMAL NUCLEAR CARDIAC IMAGING TEST: ICD-10-CM

## 2022-04-22 LAB
ALBUMIN SERPL-MCNC: 4.2 G/DL (ref 3.5–5.2)
ALBUMIN/GLOB SERPL: 2 G/DL
ALP SERPL-CCNC: 75 U/L (ref 39–117)
ALT SERPL W P-5'-P-CCNC: 14 U/L (ref 1–41)
ANION GAP SERPL CALCULATED.3IONS-SCNC: 10 MMOL/L (ref 5–15)
AST SERPL-CCNC: 13 U/L (ref 1–40)
BASOPHILS # BLD AUTO: 0.07 10*3/MM3 (ref 0–0.2)
BASOPHILS NFR BLD AUTO: 1.3 % (ref 0–1.5)
BILIRUB SERPL-MCNC: 0.2 MG/DL (ref 0–1.2)
BUN SERPL-MCNC: 23 MG/DL (ref 8–23)
BUN/CREAT SERPL: 14.6 (ref 7–25)
CALCIUM SPEC-SCNC: 9.3 MG/DL (ref 8.6–10.5)
CHLORIDE SERPL-SCNC: 102 MMOL/L (ref 98–107)
CO2 SERPL-SCNC: 24 MMOL/L (ref 22–29)
CREAT SERPL-MCNC: 1.58 MG/DL (ref 0.76–1.27)
DEPRECATED RDW RBC AUTO: 44 FL (ref 37–54)
EGFRCR SERPLBLD CKD-EPI 2021: 48.8 ML/MIN/1.73
EOSINOPHIL # BLD AUTO: 0.24 10*3/MM3 (ref 0–0.4)
EOSINOPHIL NFR BLD AUTO: 4.5 % (ref 0.3–6.2)
ERYTHROCYTE [DISTWIDTH] IN BLOOD BY AUTOMATED COUNT: 13.2 % (ref 12.3–15.4)
GLOBULIN UR ELPH-MCNC: 2.1 GM/DL
GLUCOSE SERPL-MCNC: 156 MG/DL (ref 65–99)
HCT VFR BLD AUTO: 38.3 % (ref 37.5–51)
HGB BLD-MCNC: 12.8 G/DL (ref 13–17.7)
IMM GRANULOCYTES # BLD AUTO: 0.02 10*3/MM3 (ref 0–0.05)
IMM GRANULOCYTES NFR BLD AUTO: 0.4 % (ref 0–0.5)
LYMPHOCYTES # BLD AUTO: 1.38 10*3/MM3 (ref 0.7–3.1)
LYMPHOCYTES NFR BLD AUTO: 26 % (ref 19.6–45.3)
MCH RBC QN AUTO: 30.3 PG (ref 26.6–33)
MCHC RBC AUTO-ENTMCNC: 33.4 G/DL (ref 31.5–35.7)
MCV RBC AUTO: 90.5 FL (ref 79–97)
MONOCYTES # BLD AUTO: 0.44 10*3/MM3 (ref 0.1–0.9)
MONOCYTES NFR BLD AUTO: 8.3 % (ref 5–12)
NEUTROPHILS NFR BLD AUTO: 3.15 10*3/MM3 (ref 1.7–7)
NEUTROPHILS NFR BLD AUTO: 59.5 % (ref 42.7–76)
NRBC BLD AUTO-RTO: 0 /100 WBC (ref 0–0.2)
PLATELET # BLD AUTO: 197 10*3/MM3 (ref 140–450)
PMV BLD AUTO: 9.5 FL (ref 6–12)
POTASSIUM SERPL-SCNC: 4.1 MMOL/L (ref 3.5–5.2)
PROT SERPL-MCNC: 6.3 G/DL (ref 6–8.5)
RBC # BLD AUTO: 4.23 10*6/MM3 (ref 4.14–5.8)
SODIUM SERPL-SCNC: 136 MMOL/L (ref 136–145)
WBC NRBC COR # BLD: 5.3 10*3/MM3 (ref 3.4–10.8)

## 2022-04-22 PROCEDURE — 80053 COMPREHEN METABOLIC PANEL: CPT

## 2022-04-22 PROCEDURE — 85025 COMPLETE CBC W/AUTO DIFF WBC: CPT
